# Patient Record
(demographics unavailable — no encounter records)

---

## 2025-01-07 NOTE — HISTORY OF PRESENT ILLNESS
[de-identified] : She received a COVID vaccine 12/12/24 subsequent to which she had daily migraines. A week later the headaches went away and she later developed several periods/day lasting 5-6 min of spinning vertigo which seemed triggered by sitting up or moving quickly. These have decreased and the most recent one was two days ago. Still taking Meclizine. No tinnitus but she feels like her R ear seems full. No baseline hearing loss or loud noise exp. hx.  In general she has a migraine without aura every other day. In the past rizatriptan helped; Topamax didn't.  Hx of a L thyroid nodule s/p BC II FNA in 2019. No dyphagia or dysphonia.

## 2025-01-07 NOTE — HISTORY OF PRESENT ILLNESS
[de-identified] : She received a COVID vaccine 12/12/24 subsequent to which she had daily migraines. A week later the headaches went away and she later developed several periods/day lasting 5-6 min of spinning vertigo which seemed triggered by sitting up or moving quickly. These have decreased and the most recent one was two days ago. Still taking Meclizine. No tinnitus but she feels like her R ear seems full. No baseline hearing loss or loud noise exp. hx.  In general she has a migraine without aura every other day. In the past rizatriptan helped; Topamax didn't.  Hx of a L thyroid nodule s/p BC II FNA in 2019. No dyphagia or dysphonia.

## 2025-01-07 NOTE — CONSULT LETTER
[Dear  ___] : Dear  [unfilled], [Consult Letter:] : I had the pleasure of evaluating your patient, [unfilled]. [Please see my note below.] : Please see my note below. [Consult Closing:] : Thank you very much for allowing me to participate in the care of this patient.  If you have any questions, please do not hesitate to contact me. [Sincerely,] : Sincerely, [FreeTextEntry3] : HARJINDER Bellamy Jr, MD, FAAOHNS Otolaryngologist [facility]

## 2025-01-07 NOTE — PHYSICAL EXAM
[Binocular Microscopic Exam] : Binocular microscopic exam was performed [FreeTextEntry8] : Obstructing cerumen was removed with a hook [FreeTextEntry9] : Obstructing cerumen was removed with a hook [de-identified] : EOMI/PERRL w/ no resting nystagmus; CN 2-12 intact; good smooth pursuit; negative fistula test AU; negative [place] Hallpike & supine roll test bilaterally using Frenzel goggles; normal Hamal[place]i head thrust; negative enhanced Rhomberg; unremarkable gait; no dysdiadochokinesia [Normal] : no masses and lesions seen, face is symmetric

## 2025-01-07 NOTE — DATA REVIEWED
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

## 2025-01-07 NOTE — PHYSICAL EXAM
[Binocular Microscopic Exam] : Binocular microscopic exam was performed [FreeTextEntry8] : Obstructing cerumen was removed with a hook [FreeTextEntry9] : Obstructing cerumen was removed with a hook [de-identified] : EOMI/PERRL w/ no resting nystagmus; CN 2-12 intact; good smooth pursuit; negative fistula test AU; negative [place] Hallpike & supine roll test bilaterally using Frenzel goggles; normal Hamalgyi head thrust; negative enhanced Rhomberg; unremarkable gait; no dysdiadochokinesia [Normal] : no masses and lesions seen, face is symmetric

## 2025-01-07 NOTE — DATA REVIEWED
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

## 2025-01-07 NOTE — CONSULT LETTER
[Dear  ___] : Dear  [unfilled], [Consult Letter:] : I had the pleasure of evaluating your patient, [unfilled]. [Please see my note below.] : Please see my note below. [Consult Closing:] : Thank you very much for allowing me to participate in the care of this patient.  If you have any questions, please do not hesitate to contact me. [Sincerely,] : Sincerely, [FreeTextEntry3] : HARJINDER Bellamy Jr, MD, FAAOHNS Otolaryngologist [facility]

## 2025-01-07 NOTE — ASSESSMENT
[FreeTextEntry1] : Unclear source of her essentially resolved vertigo; RTC for recurrences.   Rpt thyroid sono & RTC  I recommended that she discuss her very frequent headaches w/ neurology w/ consideration of an MRI